# Patient Record
Sex: MALE | Race: WHITE
[De-identification: names, ages, dates, MRNs, and addresses within clinical notes are randomized per-mention and may not be internally consistent; named-entity substitution may affect disease eponyms.]

---

## 2019-10-02 ENCOUNTER — HOSPITAL ENCOUNTER (OUTPATIENT)
Dept: HOSPITAL 62 - OD | Age: 49
End: 2019-10-02
Attending: OTOLARYNGOLOGY
Payer: COMMERCIAL

## 2019-10-02 DIAGNOSIS — J30.9: Primary | ICD-10-CM

## 2019-10-02 PROCEDURE — 86003 ALLG SPEC IGE CRUDE XTRC EA: CPT

## 2019-10-02 PROCEDURE — 36415 COLL VENOUS BLD VENIPUNCTURE: CPT

## 2019-10-02 PROCEDURE — 82785 ASSAY OF IGE: CPT

## 2019-10-03 ENCOUNTER — HOSPITAL ENCOUNTER (OUTPATIENT)
Dept: HOSPITAL 62 - RAD | Age: 49
End: 2019-10-03
Attending: OTOLARYNGOLOGY
Payer: COMMERCIAL

## 2019-10-03 DIAGNOSIS — R13.10: ICD-10-CM

## 2019-10-03 DIAGNOSIS — K21.9: Primary | ICD-10-CM

## 2019-10-03 PROCEDURE — 74230 X-RAY XM SWLNG FUNCJ C+: CPT

## 2019-10-03 NOTE — RADIOLOGY REPORT (SQ)
EXAM DESCRIPTION:  HANNA SWALLOW



COMPLETED DATE/TIME:  10/3/2019 8:41 am



REASON FOR STUDY:  DYSPHAGIA (R13.10) R13.10  DYSPHAGIA, UNSPECIFIED

Status post recent cervical fusion



COMPARISON:  None.



TECHNIQUE:  Videofluoroscopic swallowing examination was performed in conjunction with speech patholo
gy.  Videofluoroscopic imaging was obtained and reviewed and these are the findings:



RADIATION DOSE:  1 minutes 38 seconds of fluoroscopy was used.

2 images saved to PACS.



LIMITATIONS:  None



FINDINGS:  The patient was brought into the fluoro room and placed upright on a modified barium swall
ow chair.  The patient was then given multiple consistencies mixed with barium to swallow under live 
fluoroscopic video guidance.  According to the Speech Pathologist there was no penetration or aspirat
ion. Impression upon the posterior aspect of the proximal esophagus from multiple level cervical oste
ophytes.  Surgical rods and screws are identified extending from C4 to C7.



IMPRESSION:  NO EVIDENCE OF PENETRATION OR ASPIRATION. PLEASE SEE SPEECH PATHOLOGIST REPORT FOR OTHER
 FINDINGS AND RECOMMENDATIONS.



COMMENT:  Quality :  Final reports for procedures using fluoroscopy that document radiation exp
osure indices, or exposure time and number of fluorographic images (if radiation exposure indices are
 not available)



TECHNICAL DOCUMENTATION:  JOB ID: [id number]

 2011 [employer]- All Rights Reserved



Reading location - IP/workstation name: [place] [id number]

## 2019-10-03 NOTE — ST MODIFIED BARIUM SWALLOW
Recommendation





- Recommendations


Recommendations: Recommend follow up with ENT and possibly GI. Pharyngeal 

function is effective for all trial textures. Possible signs of reduced movement

of bolus through upper esophagus sphincter.





Medical Diagnoses





- Medical Diagnoses


Medical Diagnosis Description & ICD-10 Code(s): R13.10


Other Medical Diagnoses/Co-Morbidities: per patient report: cervical spine 

fusion C3-6 on Sept. 11 2019, sleep apnea, reflux, asthma





ST Modified Barium Swallow





- General


Date: 10/03/19


Referring Physician: Dr. Curry


Risks/Precautions: Aspiration


Date of Onset: 10/01/18 - approximate onset date


Reason for Referral: difficulty swallowing





- History


History obtained from: Patient


-: Medical - Patient arrived with wife, both provided history. Patient wearing 

neck collar at assessment. Reports having cervical spine fusion in September, 

but states that swallowing difficulties have been going on for "a long time" 

prior to surgery. Patient reports that some textures "get stuck", specifically 

identified meats, crackers, and pills. Reports frequent coughing, but no 

choking/difficulty breathing with foods.


Medications: per patient report: generic reflux medication, flonase, metformin, 

cholesterol medication, flewerol, jenuvin, benecar


Allergies: none reported





- Functional Status


Prior Functional Status: INDEPENDENT: feeding - on going globus sensation


Current Functional Limitations: feeding - globus





- Subjective


Patient/caregiver goal(s): better swallow


Cognitive-Linguistic Function: WNL


Speech Intelligibility: WNL


Current Nutritional Means: PO


Current PO diet: Regular


Current symptoms: Coughing, c/o Globus sensation


Pain: Patient reports, 3/5 - neck pain





- Objective


Assessment: Upright, Left Lateral





- Food Trials Used


Food trials used: Thin liquids, Pureed, Regular


The patient: Was Able to Self Feed





- Oral-Motor Skills


Dentition: Full


Velo-pharyngeal function: Unremarkable


Laryngeal Function: clear voicing





- Assessment


Oral prep: Normal


Labial closure: Adequate


Leakage: None


Mastication: Adequate


Lingual Movement: Normal


Oral stage: Normal for this Procedure





- Pharyngeal Stage


Initiation of Pharyngeal Stage Reflex: Normal


Decreased laryngeal elevation: No


Reduced Velopharyngeal Closure: no


Reduced pressure generation: No


reduced tongue-based retraction: No


Pre-swallow pooling in valleculae: None


Pre-Swallow pooling in pyriforms: None


Reduced Thyro-Hyoid approximation: No


Reduced epiglottic excursion: No


Reduced pharyngeal peristalsis/contraction: No


Multiple Swallows with: Cleared w/ Liquid Assist


Post-swallow residulas vallecular: Mild


Post-Swallow residuals in pyriforms: Mild


Reduced Cricopharyngeal opening: Yes - some signs of reduced movement of bolus 

through upper esophageal sphincter


Pharyngeal Stage Comments: 


Overall good pharyngeal function. Some signs of reduced pharyngeal space, 

possible soft tissue prominence into pharynx. May be related to swelling from 

cervical spine surgery. 





- Fall Risk Assessment


Medications/Conditions that increase fall risks include: Antidepressants, 

sedatives, anti-arrhythmic, diuretic, benzodiazipenes, neuroleptics.  BP 

regulation problems, cardiac problems, balance or gait deficits, neurological 

problems.


Is patient considered at risk for falls: no


Fall Risk Actions Taken: No action needed





- Behavioral Observations


During evaluation process patient: was pleasant, was cooperative, able to answer

questions





- Treatment / Educational Needs:


Treatment/Education Needs: Treatment consisted of patient education on the role 

of the Speech Pathologist.  Patient's plan of care and golas were communicated 

as well as scheduling and attendance policies.  Recommendations for initial home

program were shared.  Patient demonstrated understanding and verbalized 

agreement.





- Impression/Summary


Laryngeal Penetration: No


Tracheal Aspiration: no


Effective compensatory strategies: hard swallow


Compesatory strategies: 





liquid wash


Patient presents with: Pharyngeal stage dysph., Mild-Moderate


Risk of Aspiration: Minimal


Risk of nutritional compromise: WNL





- Recommendations


Solid diet recommendations: Regular


Liquid Diet Modification: Thin


Pt/Family education and followup with MD: Yes


Dysphagia therapy with SLP: no - due to current restrictions of movement from 

surgery, patient not able to perform dysphagia exercises at this time.


Reflux Precautions: Taught to Patient


Recommended techniques: Fully Upright During Meal, Small Bites and Sips, 

Alternate Bites/Sips


Supervision: Independent


Information, Precautions and Recommendations: Patient (Written), Patient 

(Verbal)





- Plan of Care


Strategies to optimize patient understanding include:: ongoing assessment of 

educational needs, implementation of educational strategies, and re-education.





- -


-: Thank you for the opportunity to work with this patient and his/her family.  

Should you have any questions about this patient's plan or progress, I can be 

reached at [phone].

## 2019-10-15 ENCOUNTER — HOSPITAL ENCOUNTER (OUTPATIENT)
Dept: HOSPITAL 62 - LAB | Age: 49
End: 2019-10-15
Attending: SPECIALIST
Payer: COMMERCIAL

## 2019-10-15 DIAGNOSIS — M54.12: Primary | ICD-10-CM

## 2019-10-15 LAB
ERYTHROCYTE [DISTWIDTH] IN BLOOD BY AUTOMATED COUNT: 13.8 % (ref 11.5–14)
ERYTHROCYTE [SEDIMENTATION RATE] IN BLOOD: 39 MM/HR (ref 0–20)
HCT VFR BLD CALC: 38.3 % (ref 37.9–51)
HGB BLD-MCNC: 12.9 G/DL (ref 13.5–17)
MCH RBC QN AUTO: 29.3 PG (ref 27–33.4)
MCHC RBC AUTO-ENTMCNC: 33.6 G/DL (ref 32–36)
MCV RBC AUTO: 87 FL (ref 80–97)
PLATELET # BLD: 200 10^3/UL (ref 150–450)
RBC # BLD AUTO: 4.39 10^6/UL (ref 4.35–5.55)
WBC # BLD AUTO: 8.8 10^3/UL (ref 4–10.5)

## 2019-10-15 PROCEDURE — 85027 COMPLETE CBC AUTOMATED: CPT

## 2019-10-15 PROCEDURE — 36415 COLL VENOUS BLD VENIPUNCTURE: CPT

## 2019-10-15 PROCEDURE — 85652 RBC SED RATE AUTOMATED: CPT
